# Patient Record
Sex: MALE | Race: WHITE | Employment: UNEMPLOYED | URBAN - METROPOLITAN AREA
[De-identification: names, ages, dates, MRNs, and addresses within clinical notes are randomized per-mention and may not be internally consistent; named-entity substitution may affect disease eponyms.]

---

## 2022-04-15 ENCOUNTER — HOSPITAL ENCOUNTER (OUTPATIENT)
Dept: LAB | Age: 62
Discharge: HOME | End: 2022-04-15

## 2022-04-15 LAB — XX-LABCORP SPECIMEN COL,LCBCF: NORMAL

## 2022-04-15 PROCEDURE — 99001 SPECIMEN HANDLING PT-LAB: CPT

## 2023-08-08 ENCOUNTER — OFFICE VISIT (OUTPATIENT)
Age: 63
End: 2023-08-08

## 2023-08-08 DIAGNOSIS — M19.022 PRIMARY OSTEOARTHRITIS OF LEFT ELBOW: ICD-10-CM

## 2023-08-08 DIAGNOSIS — M25.522 LEFT ELBOW PAIN: Primary | ICD-10-CM

## 2023-08-08 DIAGNOSIS — M70.22 OLECRANON BURSITIS OF LEFT ELBOW: ICD-10-CM

## 2023-08-08 NOTE — PROGRESS NOTES
plan to excise some of the traction osteophyte off of the triceps insertion. I would not plan to address the arthritis as I do not think this is one of his main complaints. He is in agreement with this plan and he will follow-up if his symptoms return for surgical discussion. Prescription medication management discussed. Plan was discussed in detail with patient, all questions were answered, and patient voiced understanding of plan. Electronically signed by: Nica Heredia MD    Note: This note was completed using voice recognition software.   Any typographical/name errors or mistakes are unintentional.